# Patient Record
Sex: FEMALE | Race: WHITE | NOT HISPANIC OR LATINO | URBAN - METROPOLITAN AREA
[De-identification: names, ages, dates, MRNs, and addresses within clinical notes are randomized per-mention and may not be internally consistent; named-entity substitution may affect disease eponyms.]

---

## 2020-09-03 NOTE — PATIENT DISCUSSION
Recommend Mini lower lift, No platysmaplasty, post-tragel, SMAS to cheeks (discussed risks and benefits of sx. .. ).

## 2020-11-01 ENCOUNTER — OFFICE VISIT (OUTPATIENT)
Dept: URBAN - METROPOLITAN AREA CLINIC 68 | Facility: CLINIC | Age: 80
End: 2020-11-01

## 2020-12-01 ENCOUNTER — OFFICE VISIT (OUTPATIENT)
Dept: URBAN - METROPOLITAN AREA CLINIC 68 | Facility: CLINIC | Age: 80
End: 2020-12-01

## 2020-12-29 NOTE — PATIENT DISCUSSION
One week post op: great curve and shape, no infection, healing well, sutures intact and removed, use erythromycin QHS to upper eyelids x 7-10 days. Use Skinuva BID x 1 month to incisions . Wear sunglasses and hat while outdoors. Avoid sun exposure. No restrictions in 5 days.

## 2021-05-05 ENCOUNTER — PREPPED CHART (OUTPATIENT)
Dept: URBAN - METROPOLITAN AREA CLINIC 32 | Facility: CLINIC | Age: 81
End: 2021-05-05

## 2022-03-01 NOTE — PATIENT DISCUSSION
Discussed future YAG LASER Capsulotomy with patient. No antibiotics or any antibiotics < 2 hrs prior to birth

## 2022-06-04 ENCOUNTER — TELEPHONE ENCOUNTER (OUTPATIENT)
Dept: URBAN - METROPOLITAN AREA CLINIC 68 | Facility: CLINIC | Age: 82
End: 2022-06-04

## 2022-06-05 ENCOUNTER — TELEPHONE ENCOUNTER (OUTPATIENT)
Dept: URBAN - METROPOLITAN AREA CLINIC 68 | Facility: CLINIC | Age: 82
End: 2022-06-05

## 2022-06-05 RX ORDER — PANTOPRAZOLE SODIUM 40 MG/1
PANTOPRAZOLE SODIUM( 40MG ORAL   ) ACTIVE -HX ENTRY TABLET, DELAYED RELEASE ORAL
Status: ACTIVE | COMMUNITY
Start: 2020-12-01

## 2022-06-05 RX ORDER — GABAPENTIN 300 MG/1
GABAPENTIN( 300MG ORAL   ) ACTIVE -HX ENTRY CAPSULE ORAL
Status: ACTIVE | COMMUNITY
Start: 2020-12-01

## 2022-06-05 RX ORDER — QUETIAPINE FUMARATE 25 MG/1
QUETIAPINE FUMARATE( 25MG ORAL   ) ACTIVE -HX ENTRY TABLET, FILM COATED ORAL
Status: ACTIVE | COMMUNITY
Start: 2020-12-01

## 2022-06-05 RX ORDER — CARVEDILOL 3.12 MG/1
CARVEDILOL( 3.125MG ORAL   ) ACTIVE -HX ENTRY TABLET, FILM COATED ORAL
Status: ACTIVE | COMMUNITY
Start: 2020-12-01

## 2022-06-05 RX ORDER — ONDANSETRON 8 MG/1
TABLET ORAL EVERY 12 HOURS
Qty: 90 | Refills: 0 | Status: ACTIVE | COMMUNITY
Start: 2020-12-01

## 2022-06-05 RX ORDER — ATORVASTATIN CALCIUM 40 MG/1
ATORVASTATIN CALCIUM( 40MG ORAL   ) ACTIVE -HX ENTRY TABLET, FILM COATED ORAL
Status: ACTIVE | COMMUNITY
Start: 2020-12-01

## 2022-06-05 RX ORDER — LEVOTHYROXINE SODIUM 125 UG/1
LEVOTHYROXINE SODIUM( 125MCG ORAL   ) ACTIVE -HX ENTRY TABLET ORAL
Status: ACTIVE | COMMUNITY
Start: 2020-12-01

## 2022-06-05 RX ORDER — LOSARTAN POTASSIUM 100 MG/1
LOSARTAN POTASSIUM( 100MG ORAL   ) ACTIVE -HX ENTRY TABLET ORAL
Status: ACTIVE | COMMUNITY
Start: 2020-12-01

## 2022-06-05 RX ORDER — PRAMIPEXOLE DIHYDROCHLORIDE 1 MG/1
PRAMIPEXOLE DIHYDROCHLORIDE( 1MG ORAL   ) ACTIVE -HX ENTRY TABLET ORAL
Status: ACTIVE | COMMUNITY
Start: 2020-12-01

## 2022-06-05 RX ORDER — SERTRALINE 25 MG/1
SERTRALINE HCL( 25MG ORAL   ) ACTIVE -HX ENTRY TABLET, FILM COATED ORAL
Status: ACTIVE | COMMUNITY
Start: 2020-12-01

## 2022-06-25 ENCOUNTER — TELEPHONE ENCOUNTER (OUTPATIENT)
Age: 82
End: 2022-06-25

## 2022-06-26 ENCOUNTER — TELEPHONE ENCOUNTER (OUTPATIENT)
Age: 82
End: 2022-06-26

## 2022-06-26 RX ORDER — PANTOPRAZOLE 40 MG/1
PANTOPRAZOLE SODIUM( 40MG ORAL   ) ACTIVE -HX ENTRY TABLET, DELAYED RELEASE ORAL
Status: ACTIVE | COMMUNITY
Start: 2020-12-01

## 2022-06-26 RX ORDER — PRAMIPEXOLE DIHYDROCHLORIDE 1 MG/1
PRAMIPEXOLE DIHYDROCHLORIDE( 1MG ORAL   ) ACTIVE -HX ENTRY TABLET ORAL
Status: ACTIVE | COMMUNITY
Start: 2020-12-01

## 2022-06-26 RX ORDER — ATORVASTATIN CALCIUM 40 MG/1
ATORVASTATIN CALCIUM( 40MG ORAL   ) ACTIVE -HX ENTRY TABLET, FILM COATED ORAL
Status: ACTIVE | COMMUNITY
Start: 2020-12-01

## 2022-06-26 RX ORDER — CARVEDILOL 3.12 MG/1
CARVEDILOL( 3.125MG ORAL   ) ACTIVE -HX ENTRY TABLET, FILM COATED ORAL
Status: ACTIVE | COMMUNITY
Start: 2020-12-01

## 2022-06-26 RX ORDER — SERTRALINE 25 MG/1
SERTRALINE HCL( 25MG ORAL   ) ACTIVE -HX ENTRY TABLET, FILM COATED ORAL
Status: ACTIVE | COMMUNITY
Start: 2020-12-01

## 2022-06-26 RX ORDER — LOSARTAN POTASSIUM 100 MG/1
LOSARTAN POTASSIUM( 100MG ORAL   ) ACTIVE -HX ENTRY TABLET, FILM COATED ORAL
Status: ACTIVE | COMMUNITY
Start: 2020-12-01

## 2022-06-26 RX ORDER — GABAPENTIN 300 MG/1
GABAPENTIN( 300MG ORAL   ) ACTIVE -HX ENTRY CAPSULE ORAL
Status: ACTIVE | COMMUNITY
Start: 2020-12-01

## 2022-06-26 RX ORDER — LEVOTHYROXINE SODIUM 125 UG/1
LEVOTHYROXINE SODIUM( 125MCG ORAL   ) ACTIVE -HX ENTRY TABLET ORAL
Status: ACTIVE | COMMUNITY
Start: 2020-12-01

## 2022-06-26 RX ORDER — ONDANSETRON 8 MG/1
TABLET ORAL EVERY 12 HOURS
Qty: 90 | Refills: 0 | Status: ACTIVE | COMMUNITY
Start: 2020-12-01

## 2022-06-26 RX ORDER — QUETIAPINE FUMARATE 25 MG/1
QUETIAPINE FUMARATE( 25MG ORAL   ) ACTIVE -HX ENTRY TABLET, FILM COATED ORAL
Status: ACTIVE | COMMUNITY
Start: 2020-12-01

## 2022-09-27 NOTE — PATIENT DISCUSSION
Instructed to call immediately if any new distortion, blurring, decreased vision or eye pain. head injury

## 2023-07-31 NOTE — PATIENT DISCUSSION
Patient made aware of 24/7 emergency services. [Wide-Based] : wide-based [UE/LE] : Motor: 5/5 motor strength in bilateral upper & lower extremities [] : Sensory: [C5-Bilat] : C5 [C6-Bilat] : C6 [C7-Bilat] : C7 [S1-Bilat] : S1 [Normal RUE] : Right Upper Extremity: No scars, rashes, lesions, ulcers, skin intact [Normal LUE] : Left Upper Extremity: No scars, rashes, lesions, ulcers, skin intact [Normal] : Oriented to person, place, and time, insight and judgement were intact and the affect was normal [de-identified] : EMG Evaluation performed on 1/12/23 shows The above electrodiagnostic study reveals evidence of a diabetic polyneuropathy. The above electrodiagnostic study reveals evidence of a left C6 radiculopathy. 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